# Patient Record
Sex: MALE | Race: WHITE | URBAN - METROPOLITAN AREA
[De-identification: names, ages, dates, MRNs, and addresses within clinical notes are randomized per-mention and may not be internally consistent; named-entity substitution may affect disease eponyms.]

---

## 2021-11-01 ENCOUNTER — OFFICE VISIT (OUTPATIENT)
Dept: URBAN - METROPOLITAN AREA CLINIC 24 | Facility: CLINIC | Age: 71
End: 2021-11-01
Payer: MEDICARE

## 2021-11-01 PROCEDURE — 99204 OFFICE O/P NEW MOD 45 MIN: CPT | Performed by: STUDENT IN AN ORGANIZED HEALTH CARE EDUCATION/TRAINING PROGRAM

## 2021-11-01 ASSESSMENT — VISUAL ACUITY
OS: 20/20
OD: 20/60

## 2021-11-01 ASSESSMENT — KERATOMETRY
OD: 43.16
OS: 43.44

## 2021-11-01 ASSESSMENT — INTRAOCULAR PRESSURE
OS: 16
OD: 14

## 2021-11-01 NOTE — IMPRESSION/PLAN
Impression: Combined forms of age-related cataract, bilateral: H25.813. OD>OS Plan: Pt ed on r/b of cataract surgery, pt would like to proceed given reduced adl.  Rec OD first then OS

## 2021-12-03 ENCOUNTER — TESTING ONLY (OUTPATIENT)
Dept: URBAN - METROPOLITAN AREA CLINIC 24 | Facility: CLINIC | Age: 71
End: 2021-12-03
Payer: MEDICARE

## 2021-12-03 DIAGNOSIS — Z01.818 ENCOUNTER FOR OTHER PREPROCEDURAL EXAMINATION: Primary | ICD-10-CM

## 2021-12-03 DIAGNOSIS — H25.813 COMBINED FORMS OF AGE-RELATED CATARACT, BILATERAL: Primary | ICD-10-CM

## 2021-12-03 PROCEDURE — 99203 OFFICE O/P NEW LOW 30 MIN: CPT | Performed by: PHYSICIAN ASSISTANT

## 2021-12-03 RX ORDER — DICLOFENAC SODIUM 75 MG/1
75 MG TABLET, DELAYED RELEASE ORAL
Qty: 0 | Refills: 0 | Status: ACTIVE
Start: 2021-12-03

## 2021-12-03 ASSESSMENT — PACHYMETRY
OS: 2.97
OD: 23.33
OS: 23.51
OD: 2.93

## 2021-12-07 ENCOUNTER — OFFICE VISIT (OUTPATIENT)
Dept: URBAN - METROPOLITAN AREA CLINIC 26 | Facility: CLINIC | Age: 71
End: 2021-12-07
Payer: MEDICARE

## 2021-12-07 DIAGNOSIS — H53.001 UNSPECIFIED AMBLYOPIA, RIGHT EYE: ICD-10-CM

## 2021-12-07 DIAGNOSIS — H52.203 BILATERAL ASTIGMATISM: ICD-10-CM

## 2021-12-07 DIAGNOSIS — H25.812 COMBINED FORMS OF AGE-RELATED CATARACT, LEFT EYE: ICD-10-CM

## 2021-12-07 PROCEDURE — 99204 OFFICE O/P NEW MOD 45 MIN: CPT | Performed by: OPHTHALMOLOGY

## 2021-12-07 ASSESSMENT — PACHYMETRY
OS: 2.97
OS: 23.51
OD: 2.93
OD: 23.33

## 2021-12-07 ASSESSMENT — INTRAOCULAR PRESSURE
OD: 15
OS: 15

## 2021-12-07 NOTE — IMPRESSION/PLAN
Impression: Bilateral astigmatism: H52.203. Plan: Discussed with patient, understands this may limit vision after surgery.

## 2021-12-07 NOTE — IMPRESSION/PLAN
Impression: Unspecified amblyopia, right eye: H53.001. Plan: Discussed with patient, understands this may limit vision after surgery.

## 2021-12-07 NOTE — IMPRESSION/PLAN
Impression: Combined forms of age-related cataract, bilateral: H25.813. OD>OS Plan: Discussed cataract diagnosis with the patient. Discussed risks, benefits and alternatives to surgery including but not limited to: bleeding, infection, risk of vision loss, loss of the eye, need for other surgery. Patient voiced understanding and wishes to proceed. Patient elects surgical treatment. Patient is a candidate for [employer]. Options discussed with patient. 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 Understands higher risk of complication and delayed healing due to dense cataract. PLAN: OK to proceed with cataract surgery after first eye cleared.

## 2021-12-13 ENCOUNTER — SURGERY (OUTPATIENT)
Dept: URBAN - METROPOLITAN AREA SURGERY 12 | Facility: SURGERY | Age: 71
End: 2021-12-13
Payer: MEDICARE

## 2021-12-13 PROCEDURE — 66984 XCAPSL CTRC RMVL W/O ECP: CPT | Performed by: OPHTHALMOLOGY

## 2021-12-14 ENCOUNTER — POST-OPERATIVE VISIT (OUTPATIENT)
Dept: URBAN - METROPOLITAN AREA CLINIC 26 | Facility: CLINIC | Age: 71
End: 2021-12-14
Payer: MEDICARE

## 2021-12-14 PROCEDURE — 99024 POSTOP FOLLOW-UP VISIT: CPT | Performed by: OPTOMETRIST

## 2021-12-14 ASSESSMENT — INTRAOCULAR PRESSURE: OD: 17

## 2021-12-14 NOTE — IMPRESSION/PLAN
Impression: S/P Cataract Extraction by phacoemulsification with IOL placement; Lensx, ORA, LRI OD - 1 Day. Encounter for surgical aftercare following surgery on a sense organ  Z48.810. Excellent post op course   Post operative instructions reviewed - Condition is improving - Plan: at's 4-6x/day 
rtc as scheduled, 12/20/2021 with Dr. Nelson Bryant Refract next visit. IOP check next visit.

## 2021-12-20 ENCOUNTER — POST-OPERATIVE VISIT (OUTPATIENT)
Dept: URBAN - METROPOLITAN AREA CLINIC 24 | Facility: CLINIC | Age: 71
End: 2021-12-20

## 2021-12-20 PROCEDURE — 99024 POSTOP FOLLOW-UP VISIT: CPT | Performed by: STUDENT IN AN ORGANIZED HEALTH CARE EDUCATION/TRAINING PROGRAM

## 2021-12-20 ASSESSMENT — VISUAL ACUITY
OS: 20/20-2
OD: 20/40-2

## 2021-12-20 ASSESSMENT — INTRAOCULAR PRESSURE
OS: 17
OD: 17

## 2021-12-20 NOTE — IMPRESSION/PLAN
Impression: S/P Cataract Extraction by phacoemulsification with IOL placement; Lensx, ORA, LRI OD - 7 Days. Encounter for surgical aftercare following surgery on a sense organ  Z48.810.  Post operative instructions reviewed - Condition is improving - Plan: Keep 1 wk f/u for cat sx OS

## 2021-12-27 ENCOUNTER — SURGERY (OUTPATIENT)
Dept: URBAN - METROPOLITAN AREA SURGERY 12 | Facility: SURGERY | Age: 71
End: 2021-12-27
Payer: MEDICARE

## 2021-12-27 PROCEDURE — 66984 XCAPSL CTRC RMVL W/O ECP: CPT | Performed by: OPHTHALMOLOGY

## 2021-12-28 ENCOUNTER — POST-OPERATIVE VISIT (OUTPATIENT)
Dept: URBAN - METROPOLITAN AREA CLINIC 24 | Facility: CLINIC | Age: 71
End: 2021-12-28
Payer: MEDICARE

## 2021-12-28 DIAGNOSIS — Z48.810 ENCOUNTER FOR SURGICAL AFTERCARE FOLLOWING SURGERY ON A SENSE ORGAN: Primary | ICD-10-CM

## 2021-12-28 PROCEDURE — 99024 POSTOP FOLLOW-UP VISIT: CPT | Performed by: STUDENT IN AN ORGANIZED HEALTH CARE EDUCATION/TRAINING PROGRAM

## 2021-12-28 ASSESSMENT — INTRAOCULAR PRESSURE: OS: 13

## 2021-12-28 NOTE — IMPRESSION/PLAN
Impression:  Encounter for surgical aftercare following surgery on a sense organ  Z48.810.  Post operative instructions reviewed - Condition is improving - Plan: Keep f/u for jan 28 2022 for final post op

## 2022-01-28 ENCOUNTER — POST-OPERATIVE VISIT (OUTPATIENT)
Dept: URBAN - METROPOLITAN AREA CLINIC 24 | Facility: CLINIC | Age: 72
End: 2022-01-28
Payer: MEDICARE

## 2022-01-28 DIAGNOSIS — H52.4 PRESBYOPIA: Primary | ICD-10-CM

## 2022-01-28 PROCEDURE — 99024 POSTOP FOLLOW-UP VISIT: CPT | Performed by: STUDENT IN AN ORGANIZED HEALTH CARE EDUCATION/TRAINING PROGRAM

## 2022-01-28 ASSESSMENT — VISUAL ACUITY
OS: 20/20
OD: 20/30

## 2022-01-28 ASSESSMENT — INTRAOCULAR PRESSURE
OS: 16
OD: 16

## 2022-01-28 NOTE — IMPRESSION/PLAN
Impression: S/P Cataract Extraction by phacoemulsification with IOL placement; Ora/lensx/toric OS - 32 Days. Encounter for surgical aftercare following surgery on a sense organ  Z48.810. Excellent post op course Plan: Pt deferred srx, ed itching may be d/t allergies vs dryness. To restart art tears if insufficient relief may add OTC ketotifen or pataday.  

RTC 11/2022 for CE, sooner prn

## 2023-01-24 ENCOUNTER — OFFICE VISIT (OUTPATIENT)
Dept: URBAN - METROPOLITAN AREA CLINIC 24 | Facility: CLINIC | Age: 73
End: 2023-01-24
Payer: MEDICARE

## 2023-01-24 DIAGNOSIS — H04.123 TEAR FILM INSUFFICIENCY OF BILATERAL LACRIMAL GLANDS: ICD-10-CM

## 2023-01-24 DIAGNOSIS — H43.812 VITREOUS DEGENERATION, LEFT EYE: Primary | ICD-10-CM

## 2023-01-24 PROCEDURE — 92014 COMPRE OPH EXAM EST PT 1/>: CPT | Performed by: OPTOMETRIST

## 2023-01-24 ASSESSMENT — INTRAOCULAR PRESSURE
OS: 14
OD: 14

## 2023-01-24 ASSESSMENT — VISUAL ACUITY
OD: 20/30
OS: 20/20

## 2023-01-24 NOTE — IMPRESSION/PLAN
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 Plan: Discussed diagnosis in detail with patient. All signs and risks of retinal detachment or tears were discussed in detail. If pt. notices any symptoms discussed, contact office ASAP.

## 2024-01-26 ENCOUNTER — OFFICE VISIT (OUTPATIENT)
Dept: URBAN - METROPOLITAN AREA CLINIC 24 | Facility: CLINIC | Age: 74
End: 2024-01-26
Payer: MEDICARE

## 2024-01-26 DIAGNOSIS — H43.812 VITREOUS DEGENERATION, LEFT EYE: Primary | ICD-10-CM

## 2024-01-26 DIAGNOSIS — H04.123 TEAR FILM INSUFFICIENCY OF BILATERAL LACRIMAL GLANDS: ICD-10-CM

## 2024-01-26 PROCEDURE — 99213 OFFICE O/P EST LOW 20 MIN: CPT | Performed by: STUDENT IN AN ORGANIZED HEALTH CARE EDUCATION/TRAINING PROGRAM

## 2024-01-26 ASSESSMENT — KERATOMETRY
OS: 43.48
OD: 43.05

## 2024-01-26 ASSESSMENT — VISUAL ACUITY: OS: 20/20

## 2024-01-26 ASSESSMENT — INTRAOCULAR PRESSURE
OD: 13
OS: 13

## 2025-01-27 ENCOUNTER — OFFICE VISIT (OUTPATIENT)
Dept: URBAN - METROPOLITAN AREA CLINIC 24 | Facility: CLINIC | Age: 75
End: 2025-01-27
Payer: MEDICARE

## 2025-01-27 DIAGNOSIS — H04.123 TEAR FILM INSUFFICIENCY OF BILATERAL LACRIMAL GLANDS: ICD-10-CM

## 2025-01-27 DIAGNOSIS — H53.001 UNSPECIFIED AMBLYOPIA, RIGHT EYE: ICD-10-CM

## 2025-01-27 DIAGNOSIS — H43.812 VITREOUS DEGENERATION, LEFT EYE: Primary | ICD-10-CM

## 2025-01-27 DIAGNOSIS — Z96.1 PRESENCE OF INTRAOCULAR LENS: ICD-10-CM

## 2025-01-27 PROCEDURE — 92014 COMPRE OPH EXAM EST PT 1/>: CPT | Performed by: STUDENT IN AN ORGANIZED HEALTH CARE EDUCATION/TRAINING PROGRAM

## 2025-01-27 ASSESSMENT — VISUAL ACUITY
OD: 20/50
OS: 20/20

## 2025-01-27 ASSESSMENT — INTRAOCULAR PRESSURE
OD: 14
OS: 14